# Patient Record
Sex: MALE | Race: WHITE
[De-identification: names, ages, dates, MRNs, and addresses within clinical notes are randomized per-mention and may not be internally consistent; named-entity substitution may affect disease eponyms.]

---

## 2020-12-10 ENCOUNTER — DASHBOARD ENCOUNTERS (OUTPATIENT)
Age: 75
End: 2020-12-10

## 2020-12-10 ENCOUNTER — OFFICE VISIT (OUTPATIENT)
Dept: URBAN - METROPOLITAN AREA CLINIC 54 | Facility: CLINIC | Age: 75
End: 2020-12-10
Payer: COMMERCIAL

## 2020-12-10 ENCOUNTER — WEB ENCOUNTER (OUTPATIENT)
Dept: URBAN - METROPOLITAN AREA CLINIC 54 | Facility: CLINIC | Age: 75
End: 2020-12-10

## 2020-12-10 DIAGNOSIS — I48.91 ATRIAL FIBRILLATION: ICD-10-CM

## 2020-12-10 DIAGNOSIS — R07.9 NON CARDIAC CHEST PAIN: ICD-10-CM

## 2020-12-10 DIAGNOSIS — R14.3 GAS AND BLOATING: ICD-10-CM

## 2020-12-10 PROCEDURE — G8482 FLU IMMUNIZE ORDER/ADMIN: HCPCS | Performed by: INTERNAL MEDICINE

## 2020-12-10 PROCEDURE — 99203 OFFICE O/P NEW LOW 30 MIN: CPT | Performed by: INTERNAL MEDICINE

## 2020-12-10 PROCEDURE — G8427 DOCREV CUR MEDS BY ELIG CLIN: HCPCS | Performed by: INTERNAL MEDICINE

## 2020-12-10 PROCEDURE — G8417 CALC BMI ABV UP PARAM F/U: HCPCS | Performed by: INTERNAL MEDICINE

## 2020-12-10 PROCEDURE — G9903 PT SCRN TBCO ID AS NON USER: HCPCS | Performed by: INTERNAL MEDICINE

## 2020-12-10 RX ORDER — HYOSCYAMINE SULFATE 0.125 MG
1 TABLET ON THE TONGUE AND ALLOW TO DISSOLVE  AS NEEDED TABLET,DISINTEGRATING ORAL
Qty: 40 | Refills: 1 | OUTPATIENT
Start: 2020-12-10 | End: 2021-02-08

## 2020-12-10 RX ORDER — ATORVASTATIN CALCIUM 40 MG/1
1 TABLET TABLET, FILM COATED ORAL ONCE A DAY
Status: ACTIVE | COMMUNITY

## 2020-12-10 RX ORDER — OMEPRAZOLE 20 MG/1
1 CAPSULE 30 MINUTES BEFORE MORNING MEAL CAPSULE, DELAYED RELEASE ORAL BID
Status: ACTIVE | COMMUNITY

## 2020-12-10 RX ORDER — DIGOXIN 125 UG/1
AS DIRECTED TABLET ORAL
Status: ACTIVE | COMMUNITY

## 2020-12-10 RX ORDER — DILTIAZEM HYDROCHLORIDE 120 MG/1
1 CAPSULE CAPSULE, EXTENDED RELEASE ORAL ONCE A DAY
Status: ACTIVE | COMMUNITY

## 2020-12-10 RX ORDER — APIXABAN 5 MG/1
AS DIRECTED TABLET, FILM COATED ORAL BID
Status: ACTIVE | COMMUNITY

## 2020-12-10 NOTE — HPI-TODAY'S VISIT:
75-year-old man from [facility] referred by Leonidas Cat MD.  He has 2 months of intermittent epigastric pain occurring immediately after meals the pain is also substernal and radiates around both sides of the chest and into the back.  It lasts anywhere from a few minutes to several hours.  It becomes fairly intense.  He has taken anti-inflammatories and Tylenol for partial relief of pain particularly at nighttime.  The pain is noncardiac and not exertional.  He has had cardiac stress test cardiac MRI is and an abdominal scan all of which were reportedly negative except for excessive gas in the intestine.  He noticed significant belching and flatulence and noticed some improvement in discomfort after passing gas.  He had also had intermittent significant abdominal bloating after eating almost anything but worse after consuming onions or garlic.  He has lost some weight as result of a modified diet.  There is no nausea or vomiting or diarrhea.  There is no rectal bleeding.  He has not had endoscopic studies in the past other than a colonoscopy several years ago that was normal.  He had a Cologuard test last year that was normal.  There is no family members with colon cancer. No improvement with PPI.  He has a history of atrial fibrillation and is on Eliquis.  He has not had a heart attack or respiratory problems.

## 2021-01-19 ENCOUNTER — OFFICE VISIT (OUTPATIENT)
Dept: RURAL CLINIC 6 | Facility: CLINIC | Age: 76
End: 2021-01-19

## 2021-01-25 ENCOUNTER — TELEPHONE ENCOUNTER (OUTPATIENT)
Dept: URBAN - METROPOLITAN AREA CLINIC 54 | Facility: CLINIC | Age: 76
End: 2021-01-25

## 2021-01-25 PROBLEM — 49436004 ATRIAL FIBRILLATION: Status: ACTIVE | Noted: 2020-12-10
